# Patient Record
Sex: FEMALE | URBAN - METROPOLITAN AREA
[De-identification: names, ages, dates, MRNs, and addresses within clinical notes are randomized per-mention and may not be internally consistent; named-entity substitution may affect disease eponyms.]

---

## 2022-10-21 ENCOUNTER — APPOINTMENT (RX ONLY)
Dept: URBAN - METROPOLITAN AREA CLINIC 151 | Facility: CLINIC | Age: 32
Setting detail: DERMATOLOGY
End: 2022-10-21

## 2022-10-21 DIAGNOSIS — B36.0 PITYRIASIS VERSICOLOR: ICD-10-CM

## 2022-10-21 PROBLEM — D23.5 OTHER BENIGN NEOPLASM OF SKIN OF TRUNK: Status: ACTIVE | Noted: 2022-10-21

## 2022-10-21 PROCEDURE — ? PRESCRIPTION MEDICATION MANAGEMENT

## 2022-10-21 PROCEDURE — ? PRESCRIPTION

## 2022-10-21 PROCEDURE — ? COUNSELING

## 2022-10-21 PROCEDURE — 99203 OFFICE O/P NEW LOW 30 MIN: CPT

## 2022-10-21 PROCEDURE — ? DIAGNOSIS COMMENT

## 2022-10-21 RX ORDER — KETOCONAZOLE 20 MG/ML
SHAMPOO, SUSPENSION TOPICAL
Qty: 120 | Refills: 0 | Status: ERX | COMMUNITY
Start: 2022-10-21

## 2022-10-21 RX ADMIN — KETOCONAZOLE: 20 SHAMPOO, SUSPENSION TOPICAL at 00:00

## 2022-10-21 NOTE — PROCEDURE: DIAGNOSIS COMMENT
Comment: Discussed with pt that rash will take multiple months to clear. BSA: <0.025%
Detail Level: Simple
Render Risk Assessment In Note?: no
Comment: Discussed with pt that removal can be done but it might look worse.

## 2022-10-21 NOTE — PROCEDURE: PRESCRIPTION MEDICATION MANAGEMENT
Initiate Treatment: ketoconazole 2 % shampoo QD
Detail Level: Zone
Render In Strict Bullet Format?: No

## 2022-10-21 NOTE — HPI: RASH
What Type Of Note Output Would You Prefer (Optional)?: Standard Output
Is The Patient Presenting As Previously Scheduled?: Yes
How Severe Is Your Rash?: mild
Is This A New Presentation, Or A Follow-Up?: Rash
Additional History: Patient has a rash on the back and is unsure how long it has been there. Pt reports the rash is not itchy or painful. rash